# Patient Record
Sex: FEMALE
[De-identification: names, ages, dates, MRNs, and addresses within clinical notes are randomized per-mention and may not be internally consistent; named-entity substitution may affect disease eponyms.]

---

## 2023-05-19 ENCOUNTER — APPOINTMENT (OUTPATIENT)
Dept: PAIN MANAGEMENT | Facility: CLINIC | Age: 73
End: 2023-05-19
Payer: MEDICARE

## 2023-05-19 VITALS — WEIGHT: 147 LBS | HEIGHT: 63 IN | BODY MASS INDEX: 26.05 KG/M2

## 2023-05-19 PROBLEM — Z00.00 ENCOUNTER FOR PREVENTIVE HEALTH EXAMINATION: Status: ACTIVE | Noted: 2023-05-19

## 2023-05-19 PROCEDURE — 99204 OFFICE O/P NEW MOD 45 MIN: CPT

## 2023-05-19 NOTE — PHYSICAL EXAM
[de-identified] : Constitutional; Appears well, no apparent distress\par Ability to communicate: Normal \par Respiratory: non-labored breathing\par Skin: No rash noted\par Head: Normocephalic, atraumatic\par Neck: no visible thyroid enlargement\par Eyes: Extraocular movements intact\par Neurologic: Alert and oriented x3\par Psychiatric: normal mood, affect and behavior\par  [] : non-antalgic

## 2023-05-19 NOTE — HISTORY OF PRESENT ILLNESS
[Lower back] : lower back [10] : 10 [Dull/Aching] : dull/aching [Sharp] : sharp [Constant] : constant [Household chores] : household chores [Sleep] : sleep [Rest] : rest [Meds] : meds [Nothing helps with pain getting better] : Nothing helps with pain getting better [Standing] : standing [Walking] : walking [FreeTextEntry1] : Initial HPI 05/19/23:\par Pain started many years ago and is across the lower back and radiates into the b/l buttocks described as a dull pain. If she moves the wrong way can get a shooting pain. \par \par MRI Lumbar Spine 12/14/21 independently reviewed: multilevel spondylosis and facet arthrosis \par Conservative Care: has done PT in the past with no relief; tried acupuncture.\par Pain Medications: oxycodone 10mg BID-TID PRN; cymbalta 90mg dailyl; was previously on Fentanyl patches; NSAIDs irritate her stomach\par Past Injections: Saw Dr. GRIFFIN and had many previous injections\par Spine surgery: none \par Blood thinners: none\par  [] : no [FreeTextEntry7] : b/l buttocks  [de-identified] : l mri

## 2023-05-19 NOTE — DISCUSSION/SUMMARY
[de-identified] : After discussing various treatment options with the patient including but not limited to oral medications, physical therapy, exercise modalities as well as interventional spinal injections, we have decided with the following plan:\par \par - Continue Home exercises, stretching, activity modification, physical therapy, and conservative care.\par - MRI report and/or images was reviewed and discussed with the patient.\par - Recommend First Diagnostic Bilateral L3,L4,L5 Medial Branch Blocks under fluoroscopic guidance with image.\par - Patient presents with axial lumbar pain that has not responded to 3 months of conservative therapy including physical therapy or NSAID therapy.  The pain is interfering with activities of daily living and functionality. There is no radicular pain. The pain is exacerbated by facet loading / positive Kemps maneuver which is defined by pain reproduction with extension and rotation of the lumbar spine to the affected side.  The patient has not had a vertebral fusion at the levels of the proposed treatment.  There is no unexplained neurologic deficit.  There is no history of systemic infection, unstable medical condition, bleeding tendency, or local infection.  The injection is being performed to diagnose the facet joint as the source of the individual's pain, in preparation for a radiofrequency ablation. \par - The risks, benefits, contents and alternatives to injection were explained in full to the patient.  Risks outlined include but are not limited to infection, sepsis, bleeding, post-dural puncture headache, nerve damage, temporary increase in pain, syncopal episode, failure to resolve symptoms, allergic reaction, symptom recurrence, and elevation of blood sugar in diabetics. Cortisone may cause immunosuppression.  Patient understands the risks.  All questions were answered.  After discussion of options, patient requested an injection.  Information regarding the injection was given to the patient.  Which medications to stop prior to the injection was explained to the patient as well.\par - Follow up in 1-2 weeks post injection for re-evaluation.\par \par - Recommend Meloxicam 15mg daily PRN. Potential adverse effects including but not limited to bleeding, ulcers, increased risk of hypertension, heart disease, kidney disease and stroke were discussed with the patient.  Medication would allow patient to be more mobile and perform ADL's.  Will continue to monitor patient and attempt to wean as soon as possible. Will use the lowest dosage possible for the shortest possible period of time.\par  - Discussed with patient that opioids are not indicated for chronic non-malignant pain as the risks of dependence, addiction, low testosterone, and potential hyperalgesia outweigh any benefit.\par

## 2023-06-05 ENCOUNTER — APPOINTMENT (OUTPATIENT)
Age: 73
End: 2023-06-05
Payer: MEDICARE

## 2023-06-05 PROCEDURE — 64494 INJ PARAVERT F JNT L/S 2 LEV: CPT | Mod: 50

## 2023-06-05 PROCEDURE — 64493 INJ PARAVERT F JNT L/S 1 LEV: CPT | Mod: 50

## 2023-06-23 ENCOUNTER — APPOINTMENT (OUTPATIENT)
Dept: PAIN MANAGEMENT | Facility: CLINIC | Age: 73
End: 2023-06-23
Payer: MEDICARE

## 2023-06-23 VITALS — WEIGHT: 147 LBS | HEIGHT: 63 IN | BODY MASS INDEX: 26.05 KG/M2

## 2023-06-23 PROCEDURE — 99214 OFFICE O/P EST MOD 30 MIN: CPT

## 2023-06-23 NOTE — PHYSICAL EXAM
[de-identified] : Constitutional; Appears well, no apparent distress\par Ability to communicate: Normal \par Respiratory: non-labored breathing\par Skin: No rash noted\par Head: Normocephalic, atraumatic\par Neck: no visible thyroid enlargement\par Eyes: Extraocular movements intact\par Neurologic: Alert and oriented x3\par Psychiatric: normal mood, affect and behavior\par  [] : non-antalgic

## 2023-06-23 NOTE — HISTORY OF PRESENT ILLNESS
[Lower back] : lower back [Dull/Aching] : dull/aching [Sharp] : sharp [Household chores] : household chores [Sleep] : sleep [Rest] : rest [Meds] : meds [Nothing helps with pain getting better] : Nothing helps with pain getting better [Standing] : standing [Walking] : walking [5] : 5 [Intermittent] : intermittent [FreeTextEntry1] : 06/23/2023: s/p B/L L3,4,5 MBB  on 06/05/23 with >80% relief and improvement of ADLs. Pain was great for a week and then started to come back a little but still feeling much better. Tried Mobic which was hurting her stomach. \par \par Initial HPI 05/19/23:\par Pain started many years ago and is across the lower back and radiates into the b/l buttocks described as a dull pain. If she moves the wrong way can get a shooting pain. \par \par MRI Lumbar Spine 12/14/21 independently reviewed: multilevel spondylosis and facet arthrosis \par Conservative Care: has done PT in the past with no relief; tried acupuncture.\par Pain Medications: oxycodone 10mg BID-TID PRN; cymbalta 90mg dailyl; was previously on Fentanyl patches; NSAIDs irritate her stomach\par Past Injections: Saw Dr. GRIFFIN and had many previous injections\par Spine surgery: none \par Blood thinners: none\par  [] : no [FreeTextEntry7] : b/l buttocks  [de-identified] : l mri  [TWNoteComboBox1] : 60%

## 2023-06-23 NOTE — DISCUSSION/SUMMARY
[de-identified] : After discussing various treatment options with the patient including but not limited to oral medications, physical therapy, exercise modalities as well as interventional spinal injections, we have decided with the following plan:\par \par - Continue Home exercises, stretching, activity modification, physical therapy, and conservative care.\par - MRI report and/or images was reviewed and discussed with the patient.\par - Recommend Second Diagnostic Bilateral L3,L4,L5 Medial Branch Blocks under fluoroscopic guidance with image. First diagnostic MBBs resulted in >80% relief and improvement of ADLs for the duration of the local anesthetic \par - Patient presents with axial lumbar pain that has not responded to 3 months of conservative therapy including physical therapy or NSAID therapy.  The pain is interfering with activities of daily living and functionality. There is no radicular pain. The pain is exacerbated by facet loading / positive Kemps maneuver which is defined by pain reproduction with extension and rotation of the lumbar spine to the affected side.  The patient has not had a vertebral fusion at the levels of the proposed treatment.  There is no unexplained neurologic deficit.  There is no history of systemic infection, unstable medical condition, bleeding tendency, or local infection.  The injection is being performed to diagnose the facet joint as the source of the individual's pain, in preparation for a radiofrequency ablation. \par - The risks, benefits, contents and alternatives to injection were explained in full to the patient.  Risks outlined include but are not limited to infection, sepsis, bleeding, post-dural puncture headache, nerve damage, temporary increase in pain, syncopal episode, failure to resolve symptoms, allergic reaction, symptom recurrence, and elevation of blood sugar in diabetics. Cortisone may cause immunosuppression.  Patient understands the risks.  All questions were answered.  After discussion of options, patient requested an injection.  Information regarding the injection was given to the patient.  Which medications to stop prior to the injection was explained to the patient as well.\par - Follow up in 1-2 weeks post injection for re-evaluation.\par - Will call to schedule\par -Can d/c Mobic as was having stomach issues with it.

## 2023-07-10 ENCOUNTER — APPOINTMENT (OUTPATIENT)
Age: 73
End: 2023-07-10
Payer: MEDICARE

## 2023-07-10 PROCEDURE — 64493 INJ PARAVERT F JNT L/S 1 LEV: CPT | Mod: 50

## 2023-07-10 PROCEDURE — 64494 INJ PARAVERT F JNT L/S 2 LEV: CPT | Mod: 50

## 2023-07-28 ENCOUNTER — APPOINTMENT (OUTPATIENT)
Dept: PAIN MANAGEMENT | Facility: CLINIC | Age: 73
End: 2023-07-28
Payer: MEDICARE

## 2023-07-28 VITALS — WEIGHT: 148 LBS | HEIGHT: 63 IN | BODY MASS INDEX: 26.22 KG/M2

## 2023-07-28 PROCEDURE — 99214 OFFICE O/P EST MOD 30 MIN: CPT

## 2023-07-28 NOTE — HISTORY OF PRESENT ILLNESS
[Lower back] : lower back [5] : 5 [Dull/Aching] : dull/aching [Sharp] : sharp [Intermittent] : intermittent [Household chores] : household chores [Sleep] : sleep [Rest] : rest [Meds] : meds [Nothing helps with pain getting better] : Nothing helps with pain getting better [Standing] : standing [Walking] : walking [FreeTextEntry1] : 07/28/2023: s/p second diagnostic B/L L3,4,5 medial branch blocks on 7/10/23 with >80% relief and improvement of ADLs. Pain is now worse in the right buttock and radiating down the right posterior thigh to the knee. \par \par 06/23/2023: s/p first diagnostic B/L L3,4,5 MBB  on 06/05/23 with >80% relief and improvement of ADLs. Pain was great for a week and then started to come back a little but still feeling much better. Tried Mobic which was hurting her stomach. \par \par Initial HPI 05/19/23:\par Pain started many years ago and is across the lower back and radiates into the b/l buttocks described as a dull pain. If she moves the wrong way can get a shooting pain. \par \par MRI Lumbar Spine 12/14/21 independently reviewed: multilevel spondylosis and facet arthrosis \par Conservative Care: has done PT in the past with no relief; tried acupuncture.\par Pain Medications: oxycodone 10mg BID-TID PRN; cymbalta 90mg daily; was previously on Fentanyl patches; NSAIDs irritate her stomach\par Past Injections: Saw Dr. GRIFFIN and had many previous injections\par Spine surgery: none \par Blood thinners: none\par  [] : no [FreeTextEntry7] : b/l buttocks  [de-identified] : l mri  [TWNoteComboBox1] : 60%

## 2023-07-28 NOTE — DISCUSSION/SUMMARY
[de-identified] : After discussing various treatment options with the patient including but not limited to oral medications, physical therapy, exercise modalities as well as interventional spinal injections, we have decided with the following plan:\par \par - Continue Home exercises, stretching, activity modification, physical therapy, and conservative care.\par - MRI report and/or images was reviewed and discussed with the patient.\par - Recommend Bilateral L3,4,5 radiofrequency ablation under under fluoroscopic guidance with image.\par - Patient has lumbosacral axial pain that is consistent with facet joint pathology. Two diagnostic blocks of the medial branch nerves with local anesthetic was performed and has resulted in at least a 80% reduction in pain for the duration of the specific local anesthetic. The pain is not radicular. There is no prior spinal fusion surgery at the level targeted.  The pain has failed to respond to three months of conservative therapy.\par - The risks, benefits, contents and alternatives to injection were explained in full to the patient.  Risks outlined include but are not limited to infection, sepsis, bleeding, post-dural puncture headache, nerve damage, temporary increase in pain, syncopal episode, failure to resolve symptoms, allergic reaction, symptom recurrence, and elevation of blood sugar in diabetics. Cortisone may cause immunosuppression.  Patient understands the risks.  All questions were answered.  After discussion of options, patient requested an injection.  Information regarding the injection was given to the patient.  Which medications to stop prior to the injection was explained to the patient as well.\par - Follow up in 1-2 weeks post injection for re-evaluation.

## 2023-07-28 NOTE — PHYSICAL EXAM
[de-identified] : Constitutional; Appears well, no apparent distress\par Ability to communicate: Normal \par Respiratory: non-labored breathing\par Skin: No rash noted\par Head: Normocephalic, atraumatic\par Neck: no visible thyroid enlargement\par Eyes: Extraocular movements intact\par Neurologic: Alert and oriented x3\par Psychiatric: normal mood, affect and behavior\par  [] : non-antalgic

## 2023-08-10 RX ORDER — GABAPENTIN 300 MG/1
300 CAPSULE ORAL
Qty: 60 | Refills: 0 | Status: ACTIVE | COMMUNITY
Start: 2023-08-10 | End: 1900-01-01

## 2023-08-30 ENCOUNTER — APPOINTMENT (OUTPATIENT)
Age: 73
End: 2023-08-30
Payer: MEDICARE

## 2023-08-30 PROCEDURE — 64636 DESTROY L/S FACET JNT ADDL: CPT | Mod: 50,59

## 2023-08-30 PROCEDURE — 64635 DESTROY LUMB/SAC FACET JNT: CPT | Mod: 50

## 2023-09-15 ENCOUNTER — APPOINTMENT (OUTPATIENT)
Dept: PAIN MANAGEMENT | Facility: CLINIC | Age: 73
End: 2023-09-15
Payer: MEDICARE

## 2023-09-15 VITALS — WEIGHT: 148 LBS | BODY MASS INDEX: 26.22 KG/M2 | HEIGHT: 63 IN

## 2023-09-15 PROCEDURE — 99214 OFFICE O/P EST MOD 30 MIN: CPT

## 2023-09-15 RX ORDER — TIZANIDINE 2 MG/1
2 TABLET ORAL
Qty: 60 | Refills: 0 | Status: ACTIVE | COMMUNITY
Start: 2023-09-15 | End: 1900-01-01

## 2023-11-01 ENCOUNTER — APPOINTMENT (OUTPATIENT)
Age: 73
End: 2023-11-01
Payer: MEDICARE

## 2023-11-01 PROCEDURE — 62323 NJX INTERLAMINAR LMBR/SAC: CPT

## 2023-11-17 ENCOUNTER — APPOINTMENT (OUTPATIENT)
Dept: PAIN MANAGEMENT | Facility: CLINIC | Age: 73
End: 2023-11-17
Payer: MEDICARE

## 2023-11-17 VITALS — BODY MASS INDEX: 27.29 KG/M2 | WEIGHT: 154 LBS | HEIGHT: 63 IN

## 2023-11-17 DIAGNOSIS — M54.50 LOW BACK PAIN, UNSPECIFIED: ICD-10-CM

## 2023-11-17 DIAGNOSIS — M47.816 SPONDYLOSIS W/OUT MYELOPATHY OR RADICULOPATHY, LUMBAR REGION: ICD-10-CM

## 2023-11-17 PROCEDURE — 99213 OFFICE O/P EST LOW 20 MIN: CPT

## 2023-11-17 RX ORDER — MELOXICAM 15 MG/1
15 TABLET ORAL
Qty: 30 | Refills: 1 | Status: DISCONTINUED | COMMUNITY
Start: 2023-05-19 | End: 2023-11-17

## 2023-12-22 ENCOUNTER — APPOINTMENT (OUTPATIENT)
Dept: ORTHOPEDIC SURGERY | Facility: CLINIC | Age: 73
End: 2023-12-22
Payer: MEDICARE

## 2023-12-22 VITALS — BODY MASS INDEX: 27.29 KG/M2 | WEIGHT: 154 LBS | HEIGHT: 63 IN

## 2023-12-22 DIAGNOSIS — I10 ESSENTIAL (PRIMARY) HYPERTENSION: ICD-10-CM

## 2023-12-22 DIAGNOSIS — Z78.9 OTHER SPECIFIED HEALTH STATUS: ICD-10-CM

## 2023-12-22 DIAGNOSIS — M54.17 RADICULOPATHY, LUMBOSACRAL REGION: ICD-10-CM

## 2023-12-22 DIAGNOSIS — M41.86 OTHER FORMS OF SCOLIOSIS, LUMBAR REGION: ICD-10-CM

## 2023-12-22 PROCEDURE — 72110 X-RAY EXAM L-2 SPINE 4/>VWS: CPT

## 2023-12-22 PROCEDURE — 99205 OFFICE O/P NEW HI 60 MIN: CPT

## 2023-12-22 PROCEDURE — 72170 X-RAY EXAM OF PELVIS: CPT

## 2024-01-16 PROBLEM — M41.86 OTHER FORM OF SCOLIOSIS OF LUMBAR SPINE: Status: ACTIVE | Noted: 2024-01-16

## 2024-01-16 NOTE — ASSESSMENT
[FreeTextEntry1] : R>L NF narrowing L3/4,  Gr 1 L4/5 with b/l LR and NF stenosis; L5/S1 central HNP abutting b/l traversing roots and facet arthropathy with sub-articular stenosis In setting of 32 degrees dextroconvex scoli  Discussed her deformity and need to address this, cannot perform a smaller construct that gets in to her lumbar curve.  Indicating for L1-L5 XLIF with T10-pelvis Laminectomy and Fusion- We've discussed the surgery details including instrumentation and grafting options (local, allograft, ICBG, and biologics) as well as potential for complications including but not limited to pain, scar, bleeding, and infection. There is also a possibility for hardware complication such as malposition of hardware, hardware loosening, pullout, failure or fracture of bone, adjacent segment disease, pseudarthrosis, and need for future surgery. Finally, we discussed potential for injury to nerves, the spinal cord either transient or permanent, damage to blood vessels, CSF leak, blindness, need for transfusion, and medical complications. The patient verbalized understanding and all questions were answered.

## 2024-01-16 NOTE — HISTORY OF PRESENT ILLNESS
[Lower back] : lower back [7] : 7 [Dull/Aching] : dull/aching [Radiating] : radiating [Constant] : constant [Sleep] : sleep [de-identified] :  s/p L4-5 LESI on 23 with no relief. Gabapentin and tizanidine made her too drowsy, Mobic was hurting her stomach.  9/15/2023: s/p b/l L3,L4,L5 RFA on 2023 with no relief thus far. Pain is in the b/l buttocks. Gabapentin was making her "woozy" and Mobic was hurting her stomach.  2023: s/p second diagnostic B/L L3,4,5 medial branch blocks on 7/10/23 with >80% relief and improvement of ADLs. Pain is now worse in the right buttock and radiating down the right posterior thigh to the knee.  2023: s/p first diagnostic B/L L3,4,5 MBB on 23 with >80% relief and improvement of ADLs. Pain was great for a week and then started to come back a little but still feeling much better. Tried Mobic which was hurting her stomach.  Initial HPI 23: Pain started many years ago and is across the lower back and radiates into the b/l buttocks described as a dull pain. If she moves the wrong way can get a shooting pain.  Pt seen by non-spine partners in the past  23 [facility] Lumbar MRI  - report noted in chart.  T12-L1: Disc bulge with posterior osseous ridging resulting in mild bilateral neural from narrowing. There is no spinal canal stenosis. At L1-2: Diffuse disc bulge with posterior osseous ridging resulting in severe left and mild right-sided neural from narrowing. There is no spinal canal stenosis. At L2-3: Diffuse disc bulge with thickening of ligamentum flavum and facet arthrosis resulting in moderate spinal canal stenosis and severe left and moderate right-sided neural foraminal narrowing. At L3-4: Diffuse disc bulge resulting in mild spinal canal stenosis and mild bilateral neural from narrowing. There is asymmetric right-sided facet arthrosis. At L4-5: Diffuse disc bulge with thickening of ligamentum flavum and moderate facet arthrosis resulting in moderate spinal canal stenosis and moderate bilateral neural foraminal narrowing. At L5-S1: Broad-based central disc herniation encroaching upon the descending right S1 nerve root within the subarticular recess and mildly narrowing the neural foramen. Ind. review- R>L NF narrowing L3/4, l Gr 1 L4/5 with b/l LR and NF stenosis; L5/S1 central HNP abutting b/l traversing roots and facet arthropathy with sub-articular stenosis ============================================ PMH: HTN, glaucoma PSH: hysterectomy, large bowel obstruction,  SH; no tob. etoh.   23- 74 yo F presenting with low back pain that has been present for 5 years. Radiates into back of right thigh. Has had numerous ESIs over the years. Has been treated by [facility]. Radiating down the BLE R>L to the posterolateral leg. No N/T. No BB dysfunction.  [] : no [FreeTextEntry7] : right leg [de-identified] : MRI/CT L SPINE completed @ [facility] 2023.

## 2025-05-01 NOTE — IMAGING
Pt was hit in the lower left lip with a foul ball just prior to arrival. Lac to left chin and her braces are stuck in her inner lower lip.    [Facet arthropathy] : Facet arthropathy [Disc space narrowing] : Disc space narrowing [Scoliosis] : Scoliosis [Spondylolithesis] : Spondylolithesis [AP] : anteroposterior [Mild arthritis (Tonnis Grade 1)] : Mild arthritis (Tonnis Grade 1) [de-identified] : LSPINE Inspection: No rash or ecchymosis Palpation: No tenderness to palpation or spasm in bilateral thoracic and lumbar paraspinal musculature, L>R SI joint tenderness to palpation ROM: limited all planes Strength: 5/5 bilateral hip flexors, knee extensors, ankle dorsiflexors, EHL, ankle plantarflexors Sensation: Sensation present to light touch bilateral L2-S1 distributions Provocative maneuvers: + bilateral straight leg raise  Bilateral hips- Palpation: No tenderness to palpation over greater trochanter or IT band ROM: No pain with flexion and internal rotation